# Patient Record
Sex: FEMALE | Race: WHITE | NOT HISPANIC OR LATINO | URBAN - METROPOLITAN AREA
[De-identification: names, ages, dates, MRNs, and addresses within clinical notes are randomized per-mention and may not be internally consistent; named-entity substitution may affect disease eponyms.]

---

## 2017-11-03 ENCOUNTER — OFFICE VISIT - HEALTHEAST (OUTPATIENT)
Dept: FAMILY MEDICINE | Facility: CLINIC | Age: 37
End: 2017-11-03

## 2017-11-03 DIAGNOSIS — D17.1 LIPOMA OF TORSO: ICD-10-CM

## 2017-11-03 ASSESSMENT — MIFFLIN-ST. JEOR: SCORE: 1403.56

## 2017-11-07 ENCOUNTER — COMMUNICATION - HEALTHEAST (OUTPATIENT)
Dept: FAMILY MEDICINE | Facility: CLINIC | Age: 37
End: 2017-11-07

## 2017-11-20 ENCOUNTER — COMMUNICATION - HEALTHEAST (OUTPATIENT)
Dept: FAMILY MEDICINE | Facility: CLINIC | Age: 37
End: 2017-11-20

## 2017-11-22 ENCOUNTER — COMMUNICATION - HEALTHEAST (OUTPATIENT)
Dept: INTERNAL MEDICINE | Facility: CLINIC | Age: 37
End: 2017-11-22

## 2017-12-15 ENCOUNTER — AMBULATORY - HEALTHEAST (OUTPATIENT)
Dept: FAMILY MEDICINE | Facility: CLINIC | Age: 37
End: 2017-12-15

## 2017-12-15 DIAGNOSIS — L98.9 SKIN LESION: ICD-10-CM

## 2017-12-21 LAB
LAB AP CHARGES (HE HISTORICAL CONVERSION): NORMAL
PATH REPORT.COMMENTS IMP SPEC: NORMAL
PATH REPORT.FINAL DX SPEC: NORMAL
PATH REPORT.GROSS SPEC: NORMAL
PATH REPORT.MICROSCOPIC SPEC OTHER STN: NORMAL
PATH REPORT.RELEVANT HX SPEC: NORMAL
RESULT FLAG (HE HISTORICAL CONVERSION): NORMAL

## 2017-12-22 ENCOUNTER — COMMUNICATION - HEALTHEAST (OUTPATIENT)
Dept: INTERNAL MEDICINE | Facility: CLINIC | Age: 37
End: 2017-12-22

## 2021-02-01 ENCOUNTER — RECORDS - HEALTHEAST (OUTPATIENT)
Dept: ADMINISTRATIVE | Facility: OTHER | Age: 41
End: 2021-02-01

## 2021-02-03 ENCOUNTER — HOSPITAL ENCOUNTER (OUTPATIENT)
Dept: RADIOLOGY | Facility: CLINIC | Age: 41
Discharge: HOME | End: 2021-02-03
Admitting: RADIOLOGY

## 2021-02-03 DIAGNOSIS — Z31.5 ENCOUNTER FOR PROCREATIVE GENETIC COUNSELING: ICD-10-CM

## 2021-05-31 VITALS — WEIGHT: 154.3 LBS | HEIGHT: 67 IN | BODY MASS INDEX: 24.22 KG/M2

## 2021-05-31 VITALS — BODY MASS INDEX: 25.09 KG/M2 | WEIGHT: 159 LBS

## 2021-06-14 NOTE — PROGRESS NOTES
Procedures  Tammy Tai is a 37 y.o. female comes in for skin lesion removal.     Patient was positioned prone, and posterior right shoulder was kept in the sterile field of view, prepped and the lesion of 5mm in size anesthetized with lido/epi.   An elliptical incision of 1 cm made to excise the lesion.  The specimen collected into the container and sent for Pathology.   Wound was closed with 4.0 ethilon suture, and wound care instructions were reviewed.   Follow up in 7-10 days for wound check and suture removal.

## 2021-06-14 NOTE — PROGRESS NOTES
"Assessment/Plan:        1. Lipoma of torso  The exam findings were discussed and pathophysiology reviewed.    She may consider an excision of the lesion.  However, this may constitute a cosmetic procedure and she may want to consult with her payer before proceeding.   Questions were answered to her satisfaction.        Follow up prn.     Subjective:    Patient ID:   Tammy Tai is a 37 y.o. female here for an evaluation of a cyst located on her right shoulder.  She first noted it a few years ago which since has mildly grown in size.  No other associated symptoms.         allergies, current medications, past family history, past medical history, past social history and past surgical history.    Review of Systems  A complete 10 point review of systems was obtained and is negative.            Objective:   /70 (Patient Site: Right Arm, Patient Position: Sitting, Cuff Size: Adult Regular)  Pulse 83  Temp 97.8  F (36.6  C) (Oral)   Ht 5' 6.75\" (1.695 m)  Wt 154 lb 4.8 oz (70 kg)  LMP 10/24/2017 (Exact Date)  SpO2 98%  BMI 24.35 kg/m2      Physical Exam  General appearance: alert and no distress  Skin: palpable 1 cm round cystic/ lipoma type lesion noted on the right shoulder. Non tender to palpation.            "

## 2021-06-27 ENCOUNTER — HEALTH MAINTENANCE LETTER (OUTPATIENT)
Age: 41
End: 2021-06-27

## 2021-07-03 NOTE — ADDENDUM NOTE
Addendum Note by Tess Calhoun MD at 11/13/2017 10:26 AM     Author: Tess Calhoun MD Service: -- Author Type: Physician    Filed: 11/13/2017 10:26 AM Encounter Date: 11/3/2017 Status: Signed    : Tess Calhoun MD (Physician)    Addended by: ODESSA ROME on: 11/13/2017 10:26 AM        Modules accepted: Orders

## 2021-10-17 ENCOUNTER — HEALTH MAINTENANCE LETTER (OUTPATIENT)
Age: 41
End: 2021-10-17

## 2022-04-03 ENCOUNTER — HEALTH MAINTENANCE LETTER (OUTPATIENT)
Age: 42
End: 2022-04-03

## 2022-10-02 ENCOUNTER — HEALTH MAINTENANCE LETTER (OUTPATIENT)
Age: 42
End: 2022-10-02

## 2023-05-20 ENCOUNTER — HEALTH MAINTENANCE LETTER (OUTPATIENT)
Age: 43
End: 2023-05-20

## 2024-03-09 ENCOUNTER — HEALTH MAINTENANCE LETTER (OUTPATIENT)
Age: 44
End: 2024-03-09